# Patient Record
Sex: FEMALE | Race: WHITE | NOT HISPANIC OR LATINO | URBAN - METROPOLITAN AREA
[De-identification: names, ages, dates, MRNs, and addresses within clinical notes are randomized per-mention and may not be internally consistent; named-entity substitution may affect disease eponyms.]

---

## 2019-03-13 ENCOUNTER — EMERGENCY (EMERGENCY)
Facility: HOSPITAL | Age: 24
LOS: 0 days | Discharge: HOME | End: 2019-03-13
Attending: EMERGENCY MEDICINE | Admitting: EMERGENCY MEDICINE

## 2019-03-13 VITALS — OXYGEN SATURATION: 100 % | RESPIRATION RATE: 15 BRPM | HEART RATE: 87 BPM

## 2019-03-13 VITALS
DIASTOLIC BLOOD PRESSURE: 83 MMHG | HEART RATE: 110 BPM | TEMPERATURE: 98 F | OXYGEN SATURATION: 100 % | RESPIRATION RATE: 18 BRPM | SYSTOLIC BLOOD PRESSURE: 139 MMHG

## 2019-03-13 DIAGNOSIS — F43.20 ADJUSTMENT DISORDER, UNSPECIFIED: ICD-10-CM

## 2019-03-13 DIAGNOSIS — Z91.018 ALLERGY TO OTHER FOODS: ICD-10-CM

## 2019-03-13 NOTE — ED PROVIDER NOTE - NSFOLLOWUPCLINICS_GEN_ALL_ED_FT
[facility]  OP Mental Health  [address] [place] [id number]  Phone: [phone]  Fax:   Follow Up Time:

## 2019-03-13 NOTE — ED BEHAVIORAL HEALTH ASSESSMENT NOTE - DESCRIPTION
Pt was in behavioral control during her ED visit. None reported. Pt is a  for [employer], lives at home with mother, graduated highschool, graduated college and studied criminal justice

## 2019-03-13 NOTE — ED PROVIDER NOTE - NSFOLLOWUPINSTRUCTIONS_ED_ALL_ED_FT
Depression    Depression is a mental illness that usually causes feelings of sadness, hopelessness, or helplessness. Some people with this disorder do not feel particularly sad but lose interest in doing things they used to enjoy (anhedonia). Major depressive disorder also can cause physical symptoms. It can interfere with work, school, relationships, and other normal everyday activities. If you were started on a medication make sure to take exactly as prescribed and follow up with a psychiatrist.    SEEK IMMEDIATE MEDICAL CARE IF YOU HAVE THE FOLLOWING SYMPTOMS: thoughts about hurting killing yourself, thoughts about hurting or killing somebody else, hallucinations, or worsening depression.     Suicide Prevention    WHAT YOU NEED TO KNOW:    You may see suicide as the only way to escape emotional or physical pain and suffering. Help is available from people who care about you, and from professionals trained in suicide prevention. Prevention includes everything you and others can do to stop you from taking your life.    DISCHARGE INSTRUCTIONS:    Call your local emergency number (911 in the ), or ask someone to call if:   You do something on purpose to hurt yourself.  You make a plan to commit suicide.    Call your doctor or therapist, or have someone close to you call if:  You act out in anger, are reckless, or are abusing alcohol or drugs.  You have serious thoughts of suicide, even with treatment.  You have more thoughts of suicide when you are alone.  You stop eating, or you begin to smoke cigarettes or drink alcohol.  You have questions or concerns about your condition or care.    What to do if you are considering suicide:   Contact a suicide prevention organization. The following are always available to help you:   National Suicide Prevention Lifeline: 1-702.372.4891 (7-349-458-TALK)    Suicide Hotline: 1-364.956.3257 (9-346-JCUWLND)    For a list of international numbers: https://save.org/find-help/international-resources/  Contact your therapist. Your doctor can give you a list of therapists if you do not have one.  Keep medicines, weapons, and alcohol out of your home.  Do not spend time alone if you have thoughts of ending your life.    Warning signs of suicide: The following can help you and others recognize that you are struggling:   Talking about your plan for committing suicide, or wanting to read or write about death or suicide  Cutting yourself, burning your skin with cigarettes, or driving recklessly  Drug or alcohol use, not taking your prescribed medicine, or taking too much  Not wanting to spend time with others or doing things you enjoy, feeling bored, or not wanting anyone to praise you  Changes in your appetite, sleep habits, energy levels, or weight  Feeling angry, or lashing out at others  A need to give away or throw away your belongings  Often skipping work  Suddenly not taking medicine for a mental illness without talking to your healthcare provider  Suddenly not going to therapy    Treatment for suicidal thoughts:     Medicines may be given to prevent mood swings, or to decrease anxiety or depression. You will need to take all medicines as directed. A sudden stop can be harmful. It may take 4 to 6 weeks for the medicine to help you feel better.    Suicide risk assessment means healthcare providers will ask questions about your suicide thoughts and plans. They will ask how often you think about suicide and if you have tried it before. They will ask if you have begun to hurt yourself, such as with cutting or reckless driving. They may ask if you have access to weapons or drugs.    A safety plan includes a list of people or groups to contact if you have suicidal feelings again. The list may include friends, family members, a spiritual leader, and others you trust. You may be asked to make a verbal agreement or sign a contract that you will not try to harm yourself.      A therapist can help you identify and change negative feelings or beliefs about yourself. This may help change the way you feel and act. A therapist can also help you find ways to cope with things that cannot be changed.    Manage depression:     Get help for drug or alcohol abuse. Drugs and alcohol can make suicidal feelings worse and make you more likely to act on them. Drugs and alcohol can also cause or increase depression.    Talk to someone you trust. Be honest about your thoughts and feelings about suicide. You can call a suicide prevention center if you do not want to talk to someone you know.    Exercise as directed. Exercise can lift your mood, give you more energy, and make it easier to sleep.     Eat a variety of healthy foods. Healthy foods include fruits, vegetables, whole-grain breads, lean meats, fish, low-fat dairy products, and beans. Try to eat regularly even if you do not feel hungry. Depression can increase from a lack of nutrition or if you are hungry for long periods of time.    Create a sleep routine. Try to go to bed and wake up at the same time every day. Let your healthcare provider know if you are having trouble sleeping.

## 2019-03-13 NOTE — ED PROVIDER NOTE - CLINICAL SUMMARY MEDICAL DECISION MAKING FREE TEXT BOX
Pt was brought to the ED for psych eval after breakup with boyfriend. Was evaluated by psych team and cleared for d/c.  Patient feeling better.  Pt dc with outpatient follow up.  Pt understands importance of outpatient follow up.  Pt given strict return precautions.

## 2019-03-13 NOTE — ED PROVIDER NOTE - OBJECTIVE STATEMENT
23 yo female no sig hx present c/o depression after her boy friend broke up with her. patient was crying and tearful so mother called EMS and brought patient to ED for evaluation. patient denies SI/HI. denies medical complaints.

## 2019-03-13 NOTE — ED BEHAVIORAL HEALTH ASSESSMENT NOTE - SUMMARY
Ms. Suggs is a 23 yo female pt with no pphx who presents to [facility] ED after breaking up with her boyfriend. Psychiatry consulted for mental health evaluation. Pt appears to be in emotional distress in the context of her recent break-up with her boyfriend, however, she does not currently meet criteria for depression, anxiety disorders or psychotic disorders at this time. Pt seems to exhibit good coping skills in regards to dealing with her stressor, and has good insight and judgement, and is willing to return to the ED in the event she develops concerning thoughts/behaviors. Thus, she is not an acute danger to herself or others at this time, and she does not currently warrant emergent IPP admissions. Rather, pt may benefit from outpatient psychiatric follow up for supportive psychotherapy to help her cope with her recent breakup. Please provide pt with [facility] OPD clinic referral at [address] [place] [address], [phone].

## 2019-03-13 NOTE — ED PROVIDER NOTE - NS ED ROS FT
Constitutional: no fever, chills, no recent weight loss, change in appetite or malaise  Eyes: no redness/discharge/pain/vision changes  Cardiac: No chest pain, SOB or edema.  Respiratory: No cough or respiratory distress  GI: No nausea, vomiting, diarrhea or abdominal pain.  MS: no pain to back or extremities, no loss of ROM, no weakness  Psych: see HPI  Neuro: No headache or weakness. No LOC.  Skin: No skin rash.  Endocrine: No history of thyroid disease or diabetes.

## 2019-03-13 NOTE — ED BEHAVIORAL HEALTH ASSESSMENT NOTE - HPI (INCLUDE ILLNESS QUALITY, SEVERITY, DURATION, TIMING, CONTEXT, MODIFYING FACTORS, ASSOCIATED SIGNS AND SYMPTOMS)
Ms. Suggs is a 23 yo  female pt with no pphx, no suicide attempts or IPP admissions who presents to [facility] ED after pt's mother called 911 concerned that pt was suicidal after breaking up with boyfriend. Psychiatry consulted for mental health evaluation. Pt reports she "had a good day until my boyfriend of 1 year broke up with me". She was skiing when she lost reception and her boyfriend tried to call her and couldn't get in touch with her. She reports they got into an argument and he [place] and she was upset. Pt states she called her mother who wanted her to come home, but she states she wanted to be by herself and continued to drive around her neighborhood until she felt ready to go home. Pt states her mother called 911 on her "because she's overreacted", who subsequently brought pt to ED for further evaluation. Pt states she is upset about the breakup, but that she states these feelings are "natural", and that she will move on. She states to cope, she has her assistant who is her bestfriend, and other close friends to turn to if she is in distress. 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 Pt denies other symptoms suggestive of depression, charlie and psychosis, and denies current suicidal/homicidal ideations, intent or plan.     Per collateral, Yenni (mother) [phone], she called 911 because she felt the pt was "inconsolable" as she made statements such as "Things won't get better" and "This sucks", but states that she knows she overreacted. She states she is a  and reports that she has seen cases where young people have killed themselves over break-ups and that she was being "overly protective". She states the pt has not endorsed suicidal/homicidal ideations, intent or plan, and that she has not acute concerns for the pt's safety at this time.     Per valentina

## 2019-03-13 NOTE — ED BEHAVIORAL HEALTH ASSESSMENT NOTE - RISK ASSESSMENT
Although pt is at mildly elevated risk due to recent break-up, her risk is mitigated by her supportive family and friends, she is future oriented, is engaged in her work as  at the [employer], has good insight and judgement, has financial and housing stability, is willing to return to ED if concerning symptoms/behaviors arise and thus, she does not warrant emergent IPP admissions at this time.

## 2019-03-13 NOTE — ED BEHAVIORAL HEALTH ASSESSMENT NOTE - SUICIDE PROTECTIVE FACTORS
Supportive social network or family/Responsibility to family and others/Identifies reasons for living/Future oriented/Engaged in work or school

## 2019-03-13 NOTE — ED PROVIDER NOTE - PHYSICAL EXAMINATION
CONSTITUTIONAL: Well-appearing; well-nourished; in no apparent distress.   EYES: PERRL; EOM intact.  CARDIOVASCULAR: Normal S1, S2; no murmurs, rubs, or gallops.   RESPIRATORY: Normal chest excursion with respiration; breath sounds clear and equal bilaterally; no wheezes, rhonchi, or rales.  GI/: Normal bowel sounds; non-distended; non-tender; no palpable organomegaly.   MS: No evidence of trauma or deformity.   SKIN: Normal for age and race; warm; dry; good turgor; no apparent lesions or exudate.   NEURO/PSYCH: A & O x 4; grossly unremarkable. depressed mood and smile on conversation, speaking coherently. move all extremities. denies SI/HI.

## 2019-03-13 NOTE — ED PROVIDER NOTE - ATTENDING CONTRIBUTION TO CARE
I personally evaluated the patient. I reviewed the Resident’s or Physician Assistant’s note (as assigned above), and agree with the findings and plan except as documented in my note.  24 yr F was brought in by EMS for psych evaluation. Pt just broke up with her boyfriend and was unconsolable on the phone with mom and she worried that pt will hurt herself and called 911. Pt reports that she is upset over her breakup but denies SI, HI. No medical complaints. Denies using any recreational drugs. VS reviewed, pt well appearing, NAD. Head ncat,normal s1s2 without any murmurs, Lungs CTAB with normal work of breathing. abd +BS, s/nd/nt, extremities wnl, neuro exam grossly normal. No acute skin rashes. Affect normal. Plan is psych eval and dispo accordingly.

## 2019-03-13 NOTE — ED ADULT NURSE NOTE - NSIMPLEMENTINTERV_GEN_ALL_ED
Implemented All Universal Safety Interventions:  [place] to call system. Call bell, personal items and telephone within reach. Instruct patient to call for assistance. Room bathroom lighting operational. Non-slip footwear when patient is off stretcher. Physically safe environment: no spills, clutter or unnecessary equipment. Stretcher in lowest position, wheels locked, appropriate side rails in place.

## 2019-03-13 NOTE — ED ADULT TRIAGE NOTE - CHIEF COMPLAINT QUOTE
pts mother called ambulance stating the pt was suicidal on admission to er pt reports boyfriend broke up with her, denies suicidal/homicidal ideation. pt denies visual or auditory hallucinations. pt a &o x4, calm and appropriate.

## 2024-05-11 NOTE — ED BEHAVIORAL HEALTH ASSESSMENT NOTE - NS ED BHA CANNABIS
BP Readings from Last 3 Encounters:   05/02/24 118/68   04/01/24 118/66   03/29/24 131/68   Blood pressure well controlled .      None known